# Patient Record
Sex: MALE | Race: WHITE | Employment: FULL TIME | URBAN - METROPOLITAN AREA
[De-identification: names, ages, dates, MRNs, and addresses within clinical notes are randomized per-mention and may not be internally consistent; named-entity substitution may affect disease eponyms.]

---

## 2018-01-01 ENCOUNTER — APPOINTMENT (OUTPATIENT)
Dept: GENERAL RADIOLOGY | Age: 48
End: 2018-01-01
Attending: INTERNAL MEDICINE
Payer: COMMERCIAL

## 2018-01-01 ENCOUNTER — HOSPITAL ENCOUNTER (EMERGENCY)
Age: 48
Discharge: HOME | End: 2018-01-01
Attending: INTERNAL MEDICINE
Payer: COMMERCIAL

## 2018-01-01 VITALS
WEIGHT: 220 LBS | DIASTOLIC BLOOD PRESSURE: 95 MMHG | RESPIRATION RATE: 16 BRPM | HEIGHT: 72 IN | SYSTOLIC BLOOD PRESSURE: 149 MMHG | TEMPERATURE: 98.8 F | HEART RATE: 107 BPM | OXYGEN SATURATION: 98 % | BODY MASS INDEX: 29.8 KG/M2

## 2018-01-01 DIAGNOSIS — L03.031 CELLULITIS OF TOE OF RIGHT FOOT: ICD-10-CM

## 2018-01-01 DIAGNOSIS — B35.1 FUNGAL TOENAIL INFECTION: Primary | ICD-10-CM

## 2018-01-01 LAB — GLUCOSE BLD STRIP.AUTO-MCNC: 246 MG/DL (ref 70–110)

## 2018-01-01 PROCEDURE — 73660 X-RAY EXAM OF TOE(S): CPT

## 2018-01-01 PROCEDURE — 90471 IMMUNIZATION ADMIN: CPT

## 2018-01-01 PROCEDURE — 82962 GLUCOSE BLOOD TEST: CPT

## 2018-01-01 PROCEDURE — 90715 TDAP VACCINE 7 YRS/> IM: CPT | Performed by: INTERNAL MEDICINE

## 2018-01-01 PROCEDURE — 74011250636 HC RX REV CODE- 250/636: Performed by: INTERNAL MEDICINE

## 2018-01-01 PROCEDURE — 99283 EMERGENCY DEPT VISIT LOW MDM: CPT

## 2018-01-01 RX ORDER — GLIMEPIRIDE 4 MG/1
4 TABLET ORAL
COMMUNITY

## 2018-01-01 RX ORDER — FAMOTIDINE 10 MG/ML
INJECTION INTRAVENOUS
Status: DISCONTINUED
Start: 2018-01-01 | End: 2018-01-02 | Stop reason: HOSPADM

## 2018-01-01 RX ORDER — MUPIROCIN 20 MG/G
OINTMENT TOPICAL 3 TIMES DAILY
Qty: 22 G | Refills: 0 | Status: SHIPPED | OUTPATIENT
Start: 2018-01-01

## 2018-01-01 RX ORDER — TERBINAFINE HYDROCHLORIDE 250 MG/1
250 TABLET ORAL DAILY
COMMUNITY

## 2018-01-01 RX ORDER — METFORMIN HYDROCHLORIDE 1000 MG/1
1000 TABLET ORAL 2 TIMES DAILY WITH MEALS
COMMUNITY

## 2018-01-01 RX ORDER — ATORVASTATIN CALCIUM 20 MG/1
20 TABLET, FILM COATED ORAL DAILY
COMMUNITY

## 2018-01-01 RX ORDER — CEPHALEXIN 500 MG/1
500 CAPSULE ORAL 3 TIMES DAILY
Qty: 21 CAP | Refills: 0 | Status: SHIPPED | OUTPATIENT
Start: 2018-01-01 | End: 2018-01-08

## 2018-01-01 RX ORDER — CLINDAMYCIN HYDROCHLORIDE 300 MG/1
300 CAPSULE ORAL 4 TIMES DAILY
Qty: 40 CAP | Refills: 0 | Status: SHIPPED | OUTPATIENT
Start: 2018-01-01 | End: 2018-01-11

## 2018-01-01 RX ORDER — LISINOPRIL 5 MG/1
5 TABLET ORAL DAILY
COMMUNITY

## 2018-01-01 RX ADMIN — TETANUS TOXOID, REDUCED DIPHTHERIA TOXOID AND ACELLULAR PERTUSSIS VACCINE, ADSORBED 0.5 ML: 5; 2.5; 8; 8; 2.5 SUSPENSION INTRAMUSCULAR at 20:40

## 2018-01-02 ENCOUNTER — HOSPITAL ENCOUNTER (INPATIENT)
Age: 48
LOS: 1 days | Discharge: HOME | DRG: 603 | End: 2018-01-03
Attending: EMERGENCY MEDICINE | Admitting: HOSPITALIST
Payer: COMMERCIAL

## 2018-01-02 DIAGNOSIS — L97.509 TYPE 2 DIABETES MELLITUS WITH FOOT ULCER, WITHOUT LONG-TERM CURRENT USE OF INSULIN (HCC): ICD-10-CM

## 2018-01-02 DIAGNOSIS — M86.171 ACUTE OSTEOMYELITIS OF TOE OF RIGHT FOOT (HCC): Primary | ICD-10-CM

## 2018-01-02 DIAGNOSIS — E11.621 TYPE 2 DIABETES MELLITUS WITH FOOT ULCER, WITHOUT LONG-TERM CURRENT USE OF INSULIN (HCC): ICD-10-CM

## 2018-01-02 PROBLEM — E11.69 ONYCHOMYCOSIS OF MULTIPLE TOENAILS WITH TYPE 2 DIABETES MELLITUS (HCC): Status: ACTIVE | Noted: 2018-01-02

## 2018-01-02 PROBLEM — M86.9 OSTEOMYELITIS OF TOE OF RIGHT FOOT (HCC): Status: ACTIVE | Noted: 2018-01-02

## 2018-01-02 PROBLEM — B35.1 ONYCHOMYCOSIS OF MULTIPLE TOENAILS WITH TYPE 1 DIABETES MELLITUS (HCC): Status: ACTIVE | Noted: 2018-01-02

## 2018-01-02 PROBLEM — B35.1 ONYCHOMYCOSIS OF MULTIPLE TOENAILS WITH TYPE 2 DIABETES MELLITUS (HCC): Status: ACTIVE | Noted: 2018-01-02

## 2018-01-02 PROBLEM — E10.69 ONYCHOMYCOSIS OF MULTIPLE TOENAILS WITH TYPE 1 DIABETES MELLITUS (HCC): Status: ACTIVE | Noted: 2018-01-02

## 2018-01-02 LAB
ANION GAP SERPL CALC-SCNC: 8 MMOL/L (ref 3–18)
BASOPHILS # BLD: 0 K/UL (ref 0–0.06)
BASOPHILS NFR BLD: 1 % (ref 0–2)
BUN SERPL-MCNC: 15 MG/DL (ref 7–18)
BUN/CREAT SERPL: 13 (ref 12–20)
CALCIUM SERPL-MCNC: 9.5 MG/DL (ref 8.5–10.1)
CHLORIDE SERPL-SCNC: 101 MMOL/L (ref 100–108)
CO2 SERPL-SCNC: 29 MMOL/L (ref 21–32)
CREAT SERPL-MCNC: 1.15 MG/DL (ref 0.6–1.3)
DIFFERENTIAL METHOD BLD: ABNORMAL
EOSINOPHIL # BLD: 0.3 K/UL (ref 0–0.4)
EOSINOPHIL NFR BLD: 4 % (ref 0–5)
ERYTHROCYTE [DISTWIDTH] IN BLOOD BY AUTOMATED COUNT: 13 % (ref 11.6–14.5)
GLUCOSE BLD STRIP.AUTO-MCNC: 201 MG/DL (ref 70–110)
GLUCOSE SERPL-MCNC: 147 MG/DL (ref 74–99)
HCT VFR BLD AUTO: 46.2 % (ref 36–48)
HGB BLD-MCNC: 15.8 G/DL (ref 13–16)
LYMPHOCYTES # BLD: 1 K/UL (ref 0.9–3.6)
LYMPHOCYTES NFR BLD: 15 % (ref 21–52)
MCH RBC QN AUTO: 30.5 PG (ref 24–34)
MCHC RBC AUTO-ENTMCNC: 34.2 G/DL (ref 31–37)
MCV RBC AUTO: 89.2 FL (ref 74–97)
MONOCYTES # BLD: 0.6 K/UL (ref 0.05–1.2)
MONOCYTES NFR BLD: 10 % (ref 3–10)
NEUTS SEG # BLD: 4.7 K/UL (ref 1.8–8)
NEUTS SEG NFR BLD: 70 % (ref 40–73)
PLATELET # BLD AUTO: 196 K/UL (ref 135–420)
PMV BLD AUTO: 10.2 FL (ref 9.2–11.8)
POTASSIUM SERPL-SCNC: 4.2 MMOL/L (ref 3.5–5.5)
RBC # BLD AUTO: 5.18 M/UL (ref 4.7–5.5)
SODIUM SERPL-SCNC: 138 MMOL/L (ref 136–145)
WBC # BLD AUTO: 6.6 K/UL (ref 4.6–13.2)

## 2018-01-02 PROCEDURE — 74011250636 HC RX REV CODE- 250/636: Performed by: PHYSICIAN ASSISTANT

## 2018-01-02 PROCEDURE — 85025 COMPLETE CBC W/AUTO DIFF WBC: CPT | Performed by: PHYSICIAN ASSISTANT

## 2018-01-02 PROCEDURE — 99283 EMERGENCY DEPT VISIT LOW MDM: CPT

## 2018-01-02 PROCEDURE — 65270000029 HC RM PRIVATE

## 2018-01-02 PROCEDURE — 74011000258 HC RX REV CODE- 258: Performed by: PHYSICIAN ASSISTANT

## 2018-01-02 PROCEDURE — 74011250636 HC RX REV CODE- 250/636: Performed by: HOSPITALIST

## 2018-01-02 PROCEDURE — 74011000250 HC RX REV CODE- 250: Performed by: HOSPITALIST

## 2018-01-02 PROCEDURE — 82962 GLUCOSE BLOOD TEST: CPT

## 2018-01-02 PROCEDURE — 75810000275 HC EMERGENCY DEPT VISIT NO LEVEL OF CARE

## 2018-01-02 PROCEDURE — 96374 THER/PROPH/DIAG INJ IV PUSH: CPT

## 2018-01-02 PROCEDURE — 87040 BLOOD CULTURE FOR BACTERIA: CPT | Performed by: PHYSICIAN ASSISTANT

## 2018-01-02 PROCEDURE — 80048 BASIC METABOLIC PNL TOTAL CA: CPT | Performed by: PHYSICIAN ASSISTANT

## 2018-01-02 RX ORDER — ENOXAPARIN SODIUM 100 MG/ML
40 INJECTION SUBCUTANEOUS EVERY 24 HOURS
Status: DISCONTINUED | OUTPATIENT
Start: 2018-01-02 | End: 2018-01-03 | Stop reason: HOSPADM

## 2018-01-02 RX ORDER — ATORVASTATIN CALCIUM 20 MG/1
20 TABLET, FILM COATED ORAL DAILY
Status: DISCONTINUED | OUTPATIENT
Start: 2018-01-03 | End: 2018-01-03 | Stop reason: HOSPADM

## 2018-01-02 RX ORDER — DEXTROSE 50 % IN WATER (D50W) INTRAVENOUS SYRINGE
50 AS NEEDED
Status: DISCONTINUED | OUTPATIENT
Start: 2018-01-02 | End: 2018-01-03 | Stop reason: HOSPADM

## 2018-01-02 RX ORDER — MAGNESIUM SULFATE 100 %
16 CRYSTALS MISCELLANEOUS AS NEEDED
Status: DISCONTINUED | OUTPATIENT
Start: 2018-01-02 | End: 2018-01-03 | Stop reason: HOSPADM

## 2018-01-02 RX ORDER — TERBINAFINE HYDROCHLORIDE 250 MG/1
250 TABLET ORAL DAILY
Status: DISCONTINUED | OUTPATIENT
Start: 2018-01-03 | End: 2018-01-03 | Stop reason: HOSPADM

## 2018-01-02 RX ORDER — INSULIN LISPRO 100 [IU]/ML
INJECTION, SOLUTION INTRAVENOUS; SUBCUTANEOUS
Status: DISCONTINUED | OUTPATIENT
Start: 2018-01-02 | End: 2018-01-03 | Stop reason: HOSPADM

## 2018-01-02 RX ORDER — LISINOPRIL 5 MG/1
5 TABLET ORAL DAILY
Status: DISCONTINUED | OUTPATIENT
Start: 2018-01-03 | End: 2018-01-03 | Stop reason: HOSPADM

## 2018-01-02 RX ADMIN — WATER 2 G: 1 INJECTION INTRAMUSCULAR; INTRAVENOUS; SUBCUTANEOUS at 20:57

## 2018-01-02 RX ADMIN — VANCOMYCIN HYDROCHLORIDE 2000 MG: 10 INJECTION, POWDER, LYOPHILIZED, FOR SOLUTION INTRAVENOUS at 15:47

## 2018-01-02 RX ADMIN — SODIUM CHLORIDE 2 G: 900 INJECTION, SOLUTION INTRAVENOUS at 15:11

## 2018-01-02 RX ADMIN — ENOXAPARIN SODIUM 40 MG: 40 INJECTION SUBCUTANEOUS at 20:58

## 2018-01-02 NOTE — ED NOTES
I have reviewed discharge instructions with the patient. The patient verbalized understanding. Patient armband removed and shredded. PT was given 3 prescriptions that were sent electronically to patients preferred pharmacy.

## 2018-01-02 NOTE — ED TRIAGE NOTES
Pt reports infection to 1st toe of left foot. Pt was seen at this facility yesterday and told to come back today for possible IV abx. Sepsis Screening completed    (  )Patient meets SIRS criteria. ( X )Patient does not meet SIRS criteria.       SIRS Criteria is achieved when two or more of the following are present   Temperature < 96.8°F (36°C) or > 100.9°F (38.3°C)   Heart Rate > 90 beats per minute   Respiratory Rate > 20 breaths per minute   WBC count > 12,000 or <4,000 or > 10% bands

## 2018-01-02 NOTE — ED PROVIDER NOTES
EMERGENCY DEPARTMENT HISTORY AND PHYSICAL EXAM    Date: 1/1/2018  Patient Name: Jerry Dai    History of Presenting Illness     Chief Complaint   Patient presents with    Foot Problem         History Provided By: Patient    Chief Complaint: Toe wound  Duration: 2 Days  Location: right great toe  Modifying Factors: Pt states no worsening or relieving factors  Associated Symptoms: swelling, redness     Additional History (Context):   8:04 PM  Jerry Dai is a 49 y.o. male with PMHX of HTN, high cholesterol, DM with neuropathy who presents to the emergency department C/O right great toe wound onset 2 days. Associated sxs include swelling and redness. Pt was dx with a fungal infection recently and was prescribed Lamisil, which he is about to finish. Pt states he was lifting 3 days ago, but does not recall injuring himself. Pt states his blood sugar is stable and has been well controlled. Pt's neuropathy is currently at baseline. Pt is unaware if Tetanus is UTD. Pt denies N/V/D, pain, fever/chills, and any other sxs or complaints. PCP: Patricia Tyson MD        Past History     Past Medical History:  Past Medical History:   Diagnosis Date    Diabetes ([address]     High cholesterol     Hypertension        Past Surgical History:  History reviewed. No pertinent surgical history. Family History:  History reviewed. No pertinent family history. Social History:  Social History   Substance Use Topics    Smoking status: None    Smokeless tobacco: None    Alcohol use None       Allergies:  No Known Allergies      Review of Systems   Review of Systems   Constitutional: Negative for chills and fever. Gastrointestinal: Negative for diarrhea, nausea and vomiting. Musculoskeletal: Negative for myalgias. Skin: Positive for wound (right large toe ). All other systems reviewed and are negative.       Physical Exam     Vitals:    01/01/18 2000   BP: (!) 149/95   Pulse: (!) 107   Resp: 16   Temp: 98.8 °F (37.1 °C)   SpO2: 98%   Weight: 99.8 kg (220 lb)   Height: 6' (1.829 m)     Physical Exam   Constitutional: He is oriented to person, place, and time. He appears well-developed and well-nourished. HENT:   Head: Normocephalic and atraumatic. Neck: Normal range of motion. Neck supple. No JVD present. No tracheal deviation present. No thyromegaly present. Cardiovascular: Normal rate, regular rhythm, normal heart sounds and intact distal pulses. 2+ DP Pulses   Pulmonary/Chest: Effort normal and breath sounds normal.   Musculoskeletal: Normal range of motion. He exhibits no edema or tenderness. Lymphadenopathy:     He has no cervical adenopathy. Neurological: He is alert and oriented to person, place, and time. He has normal reflexes. No cranial nerve deficit. He exhibits normal muscle tone. Coordination normal.   No focal weakness  Decreased sensation to soft touch of bilateral lower extremities   Skin: Skin is warm and dry. RIGHT LOWER EXTREMITY  Medial right great toe with a scab and dried blood. Nail with severe onychomycosis. No active bleeding, no foreign body  Swelling and redness up to the PIPJ  Disrupted nails on second and third toes       Psychiatric: He has a normal mood and affect. His behavior is normal. Thought content normal.   Nursing note and vitals reviewed.       Diagnostic Study Results     Labs -     Recent Results (from the past 12 hour(s))   GLUCOSE, POC    Collection Time: 01/01/18  8:20 PM   Result Value Ref Range    Glucose (POC) 246 (H) 70 - 110 mg/dL       Radiologic Studies -   XR GREAT TOE RT MIN 2 V    (Results Pending)     CT Results  (Last 48 hours)    None        CXR Results  (Last 48 hours)    None        9:15 PM  RADIOLOGY FINDINGS  Right large toe X-ray shows no acute fracture, no osteomyelitis   Pending review by Radiologist  Recorded by Charly Oquendo ED Scribe, as dictated by Myrna Adams MD    Medications given in the ED-  Medications   famotidine (PF) (PEPCID) 20 mg/2 mL injection (  Canceled Entry 1/1/18 2045)   diph,Pertuss(AC),Tet Vac-PF (BOOSTRIX) suspension 0.5 mL (0.5 mL IntraMUSCular Given 1/1/18 2040)         Medical Decision Making   I am the first provider for this patient. I reviewed the vital signs, available nursing notes, past medical history, past surgical history, family history and social history. Vital Signs-Reviewed the patient's vital signs. Pulse Oximetry Analysis - 98% on RA     Records Reviewed: Nursing Notes    Provider Notes (Medical Decision Making): Cellulitis of great toe with early abscess possible. Given pt is diabetic with neuropathy will r/o fracture and osteomyelitis. Procedures:  Procedures    ED Course:   8:04 PM - Initial assessment performed. The patients presenting problems have been discussed, and they are in agreement with the care plan formulated and outlined with them. I have encouraged them to ask questions as they arise throughout their visit. Diagnosis and Disposition       DISCHARGE NOTE:  9:16 PM  Shaniqua Goodell  results have been reviewed with him. He has been counseled regarding his diagnosis, treatment, and plan. He verbally conveys understanding and agreement of the signs, symptoms, diagnosis, treatment and prognosis and additionally agrees to follow up as discussed. He also agrees with the care-plan and conveys that all of his questions have been answered. I have also provided discharge instructions for him that include: educational information regarding their diagnosis and treatment, and list of reasons why they would want to return to the ED prior to their follow-up appointment, should his condition change. He has been provided with education for proper emergency department utilization. CLINICAL IMPRESSION:  1. Fungal toenail infection    2. Cellulitis of toe of right foot        PLAN:  1. D/C Home  2.    Discharge Medication List as of 1/1/2018  9:15 PM      START taking these medications Details   cephALEXin (KEFLEX) 500 mg capsule Take 1 Cap by mouth three (3) times daily for 7 days. , Normal, Disp-21 Cap, R-0      mupirocin (BACTROBAN) 2 % ointment Apply  to affected area three (3) times daily. Apply to area for 10 days, Normal, Disp-22 g, R-0      clindamycin (CLEOCIN) 300 mg capsule Take 1 Cap by mouth four (4) times daily for 10 days. , Normal, Disp-40 Cap, R-0         CONTINUE these medications which have NOT CHANGED    Details   metFORMIN (GLUCOPHAGE) 1,000 mg tablet Take 1,000 mg by mouth two (2) times daily (with meals). , Historical Med      glimepiride (AMARYL) 4 mg tablet Take 4 mg by mouth every morning., Historical Med      lisinopril (PRINIVIL, ZESTRIL) 5 mg tablet Take 5 mg by mouth daily. , Historical Med      atorvastatin (LIPITOR) 20 mg tablet Take 20 mg by mouth daily. , Historical Med      terbinafine HCl (LAMISIL) 250 mg tablet Take 250 mg by mouth daily. , Historical Med           3. Follow-up Information     Follow up With Details Comments Contact Info    Manoj Miles MD Schedule an appointment as soon as possible for a visit in 2 days For primary care follow up [address] [place] [address] EMERGENCY DEPT Go to As needed, if symptoms worsen [address] [id number]  [phone]        _______________________________    Attestations: This note is prepared by Andie Johnson, acting as Scribe for Nicole Ng MD.    Nicole Ng MD:  The scribe's documentation has been prepared under my direction and personally reviewed by me in its entirety.   I confirm that the note above accurately reflects all work, treatment, procedures, and medical decision making performed by me.  _______________________________

## 2018-01-02 NOTE — DISCHARGE INSTRUCTIONS
Dressing change 2x day. Keep the wound clean, may use soap and water to clean but otherwise keep it dry. Cellulitis: Care Instructions  Your Care Instructions    Cellulitis is a skin infection. It often occurs after a break in the skin from a scrape, cut, bite, or puncture, or after a rash. The doctor has checked you carefully, but problems can develop later. If you notice any problems or new symptoms, get medical treatment right away. Follow-up care is a key part of your treatment and safety. Be sure to make and go to all appointments, and call your doctor if you are having problems. It's also a good idea to know your test results and keep a list of the medicines you take. How can you care for yourself at home? · Take your antibiotics as directed. Do not stop taking them just because you feel better. You need to take the full course of antibiotics. · Prop up the infected area on pillows to reduce pain and swelling. Try to keep the area above the level of your heart as often as you can. · If your doctor told you how to care for your wound, follow your doctor's instructions. If you did not get instructions, follow this general advice:  ¨ Wash the wound with clean water 2 times a day. Don't use hydrogen peroxide or alcohol, which can slow healing. ¨ You may cover the wound with a thin layer of petroleum jelly, such as Vaseline, and a nonstick bandage. ¨ Apply more petroleum jelly and replace the bandage as needed. · Be safe with medicines. Take pain medicines exactly as directed. ¨ If the doctor gave you a prescription medicine for pain, take it as prescribed. ¨ If you are not taking a prescription pain medicine, ask your doctor if you can take an over-the-counter medicine. To prevent cellulitis in the future  · Try to prevent cuts, scrapes, or other injuries to your skin. Cellulitis most often occurs where there is a break in the skin.   · If you get a scrape, cut, mild burn, or bite, wash the wound with clean water as soon as you can to help avoid infection. Don't use hydrogen peroxide or alcohol, which can slow healing. · If you have swelling in your legs (edema), support stockings and good skin care may help prevent leg sores and cellulitis. · Take care of your feet, especially if you have diabetes or other conditions that increase the risk of infection. Wear shoes and socks. Do not go barefoot. If you have athlete's foot or other skin problems on your feet, talk to your doctor about how to treat them. When should you call for help? Call your doctor now or seek immediate medical care if:  ? · You have signs that your infection is getting worse, such as:  ¨ Increased pain, swelling, warmth, or redness. ¨ Red streaks leading from the area. ¨ Pus draining from the area. ¨ A fever. ? · You get a rash. ? Watch closely for changes in your health, and be sure to contact your doctor if:  ? · You are not getting better after 1 day (24 hours). ? · You do not get better as expected. Where can you learn more? Go to http://jimboRhytecrobin.info/. Jacinto Kerr in the search box to learn more about \"Cellulitis: Care Instructions. \"  Current as of: October 13, 2016  Content Version: 11.4  © 3049-5345 [employer]. Care instructions adapted under license by [employer] (which disclaims liability or warranty for this information). If you have questions about a medical condition or this instruction, always ask your healthcare professional. [place] [address] any warranty or liability for your use of this information. Toenail Fungus: Care Instructions  Your Care Instructions  A toenail that is infected by a fungus usually turns white or yellow. As the fungus spreads, the nail turns a darker color and gets thicker, and its edges start to turn ragged and crumble. A bad infection can cause toe pain, and the nail may pull away from the toe.   Toenails that are exposed to moisture and warmth a lot are more likely to get infected by a fungus. This can happen from wearing sweaty shoes often and from walking barefoot on shower floors. It is hard to treat toenail fungus, and the infection can return after it has cleared up. But medicines can sometimes get rid of toenail fungus for good. If the infection is very bad, or if it causes a lot of pain, you may need to have the nail removed. Follow-up care is a key part of your treatment and safety. Be sure to make and go to all appointments, and call your doctor if you are having problems. It's also a good idea to know your test results and keep a list of the medicines you take. How can you care for yourself at home? · Take your medicines exactly as prescribed. Call your doctor if you have any problems with your medicine. You will get more details on the specific medicines your doctor prescribes. · If your doctor gave you a cream or liquid to put on your toenail, use it exactly as directed. · Wash your feet often, and wash your hands after touching your feet. · Keep your toenails clean and dry. Dry your feet completely after you bathe and before you put on shoes and socks. · Keep your toenails trimmed. · Change socks often. Wear dry socks that absorb moisture. · Do not go barefoot in public places. · Use a spray or powder that fights fungus on your feet and in your shoes. · Do not pick at the skin around your nails. · Do not use nail polish or fake nails on your toenails. When should you call for help? Call your doctor now or seek immediate medical care if:  ? · You have signs of infection, such as:  ¨ Increased pain, swelling, warmth, or redness. ¨ Red streaks leading from the site. ¨ Pus draining from the site. ¨ A fever. ? · You have new or increased toe pain. ? Watch closely for changes in your health, and be sure to contact your doctor if:  ? · You do not get better as expected.    Where can you learn more? Go to http://jimbo-robin.info/. Enter D202 in the search box to learn more about \"Toenail Fungus: Care Instructions. \"  Current as of: October 13, 2016  Content Version: 11.4  © 4394-9695 [employer]. Care instructions adapted under license by [employer] (which disclaims liability or warranty for this information). If you have questions about a medical condition or this instruction, always ask your healthcare professional. [place] [address] any warranty or liability for your use of this information.

## 2018-01-02 NOTE — ED TRIAGE NOTES
Pt reports being treated for fungus to toenail of right foot with lamisil by PCP. Noticed this weekend that toes were bleeding and area looked infected. Sepsis Screening completed    (  )Patient meets SIRS criteria. ( x )Patient does not meet SIRS criteria.       SIRS Criteria is achieved when two or more of the following are present   Temperature < 96.8°F (36°C) or > 100.9°F (38.3°C)   Heart Rate > 90 beats per minute   Respiratory Rate > 20 breaths per minute   WBC count > 12,000 or <4,000 or > 10% bands

## 2018-01-02 NOTE — ED PROVIDER NOTES
EMERGENCY DEPARTMENT HISTORY AND PHYSICAL EXAM    Date: 1/2/2018  Patient Name: Shane Guajardo    History of Presenting Illness     Chief Complaint   Patient presents with    Nail Problem       History Provided By: Patient    Chief Complaint: toe wound  Duration: 3 Days  Location: right great toe  Associated Symptoms: swelling, minimal controlled bleeding, and redness    Additional History (Context):   2:45 PM  Shane Guajardo is a 49 y.o. male with PMHX of DM, HTN, HLD, neuropathy who presents to the emergency department C/O  right great toe wound onset 3 days ago. Pt was dx with a fungal infection recently and was prescribed Lamisil. Pt was seen by this facility for same 1 day ago, had lab work and xray done, was dx with cellulitis and fungal toe infection, and was rx Cleocin, Bactroban, and Keflex. Pt called today to return to ED for \"subtle periosteal reactive changes are noted in this region, with suspicion for osteomyelitis of the great toe distal phalanx\" seen on xray by radiologist. Associated symptoms include swelling, minimal controlled bleeding, and redness. Will schedule appointment with Dr. Lorain Leventhal, podiatrist. Pt denies fever, right great toe pain and any other Sx or complaints. PCP: Donavon Driscoll MD    Current Facility-Administered Medications   Medication Dose Route Frequency Provider Last Rate Last Dose    vancomycin (VANCOCIN) 2,000 mg in 0.9% sodium chloride 500 mL IVPB  2,000 mg IntraVENous ONCE COTY Cid 250 mL/hr at 01/02/18 1547 2,000 mg at 01/02/18 1547     Current Outpatient Prescriptions   Medication Sig Dispense Refill    metFORMIN (GLUCOPHAGE) 1,000 mg tablet Take 1,000 mg by mouth two (2) times daily (with meals).  glimepiride (AMARYL) 4 mg tablet Take 4 mg by mouth every morning.  lisinopril (PRINIVIL, ZESTRIL) 5 mg tablet Take 5 mg by mouth daily.  atorvastatin (LIPITOR) 20 mg tablet Take 20 mg by mouth daily.       terbinafine HCl (LAMISIL) 250 mg tablet Take 250 mg by mouth daily.  cephALEXin (KEFLEX) 500 mg capsule Take 1 Cap by mouth three (3) times daily for 7 days. 21 Cap 0    mupirocin (BACTROBAN) 2 % ointment Apply  to affected area three (3) times daily. Apply to area for 10 days 22 g 0    clindamycin (CLEOCIN) 300 mg capsule Take 1 Cap by mouth four (4) times daily for 10 days. 40 Cap 0       Past History     Past Medical History:  Past Medical History:   Diagnosis Date    Diabetes ([address]     High cholesterol     Hypertension        Past Surgical History:  History reviewed. No pertinent surgical history. Family History:  History reviewed. No pertinent family history. Social History:  Social History   Substance Use Topics    Smoking status: None    Smokeless tobacco: None    Alcohol use None       Allergies:  No Known Allergies      Review of Systems   Review of Systems   Constitutional: Negative for fever. Musculoskeletal: Negative for myalgias (right great toe pain). Skin: Positive for color change (erythema) and wound (right great toe ). (+) edema   (+) minimal controlled bleeding     All other systems reviewed and are negative. Physical Exam     Vitals:    01/02/18 1430   BP: (!) 143/97   Pulse: 93   Resp: 18   Temp: 98.2 °F (36.8 °C)   SpO2: 98%   Weight: 99.8 kg (220 lb 0.3 oz)   Height: 6' (1.829 m)     Physical Exam   Vital signs and nursing notes reviewed. CONSTITUTIONAL: Alert. Well-appearing; well-nourished; in no apparent distress. CV: Normal S1, S2; no murmurs, rubs, or gallops. No chest wall tenderness. RESPIRATORY: Normal chest excursion with respiration; breath sounds clear and equal bilaterally; no wheezes, rhonchi, or rales. EXT: Right great toe swollen and erythematous with raised partially detached thickened yellow nail; single <1cm open superficial wound to tip of great toe, no drainage or fluctuance, sensation intact, cap refill  <2sec.   SKIN: Normal for age and race; warm; dry; good turgor; no apparent lesions or exudate. NEURO: A & O x3. Motor 5/5 bilaterally. Sensation intact. PSYCH:  Mood and affect appropriate. Diagnostic Study Results     Labs -     Recent Results (from the past 12 hour(s))   CBC WITH AUTOMATED DIFF    Collection Time: 01/02/18  3:00 PM   Result Value Ref Range    WBC 6.6 4.6 - 13.2 K/uL    RBC 5.18 4.70 - 5.50 M/uL    HGB 15.8 13.0 - 16.0 g/dL    HCT 46.2 36.0 - 48.0 %    MCV 89.2 74.0 - 97.0 FL    MCH 30.5 24.0 - 34.0 PG    MCHC 34.2 31.0 - 37.0 g/dL    RDW 13.0 11.6 - 14.5 %    PLATELET 434 768 - 389 K/uL    MPV 10.2 9.2 - 11.8 FL    NEUTROPHILS 70 40 - 73 %    LYMPHOCYTES 15 (L) 21 - 52 %    MONOCYTES 10 3 - 10 %    EOSINOPHILS 4 0 - 5 %    BASOPHILS 1 0 - 2 %    ABS. NEUTROPHILS 4.7 1.8 - 8.0 K/UL    ABS. LYMPHOCYTES 1.0 0.9 - 3.6 K/UL    ABS. MONOCYTES 0.6 0.05 - 1.2 K/UL    ABS. EOSINOPHILS 0.3 0.0 - 0.4 K/UL    ABS. BASOPHILS 0.0 0.0 - 0.06 K/UL    DF AUTOMATED     METABOLIC PANEL, BASIC    Collection Time: 01/02/18  3:00 PM   Result Value Ref Range    Sodium 138 136 - 145 mmol/L    Potassium 4.2 3.5 - 5.5 mmol/L    Chloride 101 100 - 108 mmol/L    CO2 29 21 - 32 mmol/L    Anion gap 8 3.0 - 18 mmol/L    Glucose 147 (H) 74 - 99 mg/dL    BUN 15 7.0 - 18 MG/DL    Creatinine 1.15 0.6 - 1.3 MG/DL    BUN/Creatinine ratio 13 12 - 20      GFR est AA >60 >60 ml/min/1.73m2    GFR est non-AA >60 >60 ml/min/1.73m2    Calcium 9.5 8.5 - 10.1 MG/DL       Radiologic Studies -   MRI FOOT RT W CONT    (Results Pending)     CT Results  (Last 48 hours)    None        CXR Results  (Last 48 hours)    None          Medications given in the ED-  Medications   vancomycin (VANCOCIN) 2,000 mg in 0.9% sodium chloride 500 mL IVPB (2,000 mg IntraVENous New Bag 1/2/18 1547)   cefepime (MAXIPIME) 2 g in 0.9% sodium chloride (MBP/ADV) 100 mL ADV (2 g IntraVENous Given 1/2/18 1511)         Medical Decision Making   I am the first provider for this patient.     I reviewed the vital signs, available nursing notes, past medical history, past surgical history, family history and social history. Vital Signs-Reviewed the patient's vital signs. Pulse Oximetry Analysis - 98% on RA     Records Reviewed: Nursing Notes and Old Medical Records      Procedures:  Procedures    ED Course:   2:45 PM Initial assessment performed. The patients presenting problems have been discussed, and they are in agreement with the care plan formulated and outlined with them. I have encouraged them to ask questions as they arise throughout their visit. 3:15 PM Discussed patient's history, exam, and available diagnostics results with Almita Rdz MD, ED physician, who recommends adding blood culture, Vancocin, and Cefepime. 3:20 PM Discussed patient's history, exam, and available diagnostics results with Santy Walsh MD, hospitalist, who accepts patient to medical.    3:44 PM Discussed patient's history, exam, and available diagnostics results with Renate Spears DPM, who requested an MRI of the foot with contrast and will consult. 4:00PM  MRI tech not in ED. Will get MRI in am.      Diagnosis and Disposition     Core Measures:  For Hospitalized Patients:    1. Hospitalization Decision Time:  The decision to hospitalize the patient was made by Ghassan Monson PA-C at 3:00 PM on 1/2/2018    2. Aspirin: Aspirin was not given because the patient did not present with a stroke at the time of their Emergency Department evaluation    3:25 PM  Patient is being admitted to the hospital by Santy Walsh MD. The results of their tests and reasons for their admission have been discussed with them and/or available family. They convey agreement and understanding for the need to be admitted and for their admission diagnosis. CONDITIONS ON ADMISSION:  Sepsis is not present at the time of admission. Deep Vein Thrombosis is not present at the time of admission. Thrombosis is not present at the time of admission. Urinary Tract Infection is not present at the time of admission. Pneumonia is not present at the time of admission. MRSA unknown at the time of admission. Wound infection is present at the time of admission. Pressure Ulcer is not present at the time of admission. CLINICAL IMPRESSION:    1. Acute osteomyelitis of toe of right foot ([facility] [address]    2. Type 2 diabetes mellitus with foot ulcer, without long-term current use of insulin ([facility] [address] [facility]        PLAN:  1. Admission  _______________________________    Attestations: This note is prepared by Carol Garcia, acting as Scribe for Constance Jameson PA-C. Constance Jameson PA-C:  The scribe's documentation has been prepared under my direction and personally reviewed by me in its entirety.   I confirm that the note above accurately reflects all work, treatment, procedures, and medical decision making performed by me.  _______________________________

## 2018-01-02 NOTE — CALL BACK NOTE
Voicemail left for patient to call back. Needs to [facility] asap. Great toe x-ray discrepancy suggestive of osteomyelitis.

## 2018-01-02 NOTE — ED NOTES
ANU ANSARI gave V.O.  To change MRI order to routine instead of STAT;  MRI tech, Janna Diaz, is aware and will do testing in am;

## 2018-01-02 NOTE — IP AVS SNAPSHOT
[address] [address] 
[phone] Patient: Summer Armendariz MRN: [id number] Ivan Lovettari About your hospitalization You were admitted on:  January 2, 2018 You last received care in the:  [address] [place] [address] You were discharged on:  January 3, 2018 Why you were hospitalized Your primary diagnosis was:  Osteomyelitis Of Toe Of Right Foot (Hcc) Your diagnoses also included:  Onychomycosis Of Multiple Toenails With Type 2 Diabetes Mellitus (Hcc), Diabetes (Hcc), Hypertension Follow-up Information Follow up With Details Comments Contact Info Anabel Lechuga [facility] 
[phone] SANDY Dexter at [facility]. podiatry office next week. Continue oral abx until this time. [address] [facility] [address] [place] [address] 
[phone] Discharge Orders None A check arthur indicates which time of day the medication should be taken. My Medications CONTINUE taking these medications Instructions Each Dose to Equal  
 Morning Noon Evening Bedtime  
 atorvastatin 20 mg tablet Commonly known as:  LIPITOR Your last dose was: Your next dose is: Take 20 mg by mouth daily. 20 mg  
    
   
   
   
  
 cephALEXin 500 mg capsule Commonly known as:  Verlena Bell Your last dose was: Your next dose is: Take 1 Cap by mouth three (3) times daily for 7 days. 500 mg  
    
   
   
   
  
 clindamycin 300 mg capsule Commonly known as:  CLEOCIN Your last dose was: Your next dose is: Take 1 Cap by mouth four (4) times daily for 10 days. 300 mg  
    
   
   
   
  
 glimepiride 4 mg tablet Commonly known as:  AMARYL Your last dose was: Your next dose is: Take 4 mg by mouth every morning. 4 mg LamISIL 250 mg tablet Generic drug:  terbinafine HCl Your last dose was: Your next dose is: Take 250 mg by mouth daily. 250 mg  
    
   
   
   
  
 lisinopril 5 mg tablet Commonly known as:  Jaxson Upland Your last dose was: Your next dose is: Take 5 mg by mouth daily. 5 mg  
    
   
   
   
  
 metFORMIN 1,000 mg tablet Commonly known as:  GLUCOPHAGE Your last dose was: Your next dose is: Take 1,000 mg by mouth two (2) times daily (with meals). 1000 mg  
    
   
   
   
  
 mupirocin 2 % ointment Commonly known as:  [facility] Your last dose was: Your next dose is:    
   
   
 Apply  to affected area three (3) times daily. Apply to area for 10 days Discharge Instructions Diabetes Foot Health: Care Instructions Your Care Instructions When you have diabetes, your feet need extra care and attention. Diabetes can damage the nerve endings and blood vessels in your feet, making you less likely to notice when your feet are injured. Diabetes also limits your body's ability to fight infection and get blood to areas that need it. If you get a minor foot injury, it could become an ulcer or a serious infection. With good foot care, you can prevent most of these problems. Caring for your feet can be quick and easy. Most of the care can be done when you are bathing or getting ready for bed. Follow-up care is a key part of your treatment and safety. Be sure to make and go to all appointments, and call your doctor if you are having problems. It's also a good idea to know your test results and keep a list of the medicines you take. How can you care for yourself at home? · Keep your blood sugar close to normal by watching what and how much you eat, monitoring blood sugar, taking medicines if prescribed, and getting regular exercise. · Do not smoke. Smoking affects blood flow and can make foot problems worse. If you need help quitting, talk to your doctor about stop-smoking programs and medicines. These can increase your chances of quitting for good. · Eat a diet that is low in fats. High fat intake can cause fat to build up in your blood vessels and decrease blood flow. · Inspect your feet daily for blisters, cuts, cracks, or sores. If you cannot see well, use a mirror or have someone help you. · Take care of your feet: 
[facility] your feet every day. Use warm (not hot) water. Check the water temperature with your wrists or other part of your body, not your feet. ¨ Dry your feet well. Pat them dry. Do not rub the skin on your feet too hard. Dry well between your toes. If the skin on your feet stays moist, bacteria or a fungus can grow, which can lead to infection. ¨ Keep your skin soft. Use moisturizing skin cream to keep the skin on your feet soft and prevent calluses and cracks. But do not put the cream between your toes, and stop using any cream that causes a rash. ¨ Clean underneath your toenails carefully. Do not use a sharp object to clean underneath your toenails. Use the blunt end of a nail file or other rounded tool. ¨ Trim and file your toenails straight across to prevent ingrown toenails. Use a nail clipper, not scissors. Use an emery board to smooth the edges. · Change socks daily. Socks without seams are best, because seams often rub the feet. You can find socks for people with diabetes from specialty catalogs. · Look inside your shoes every day for things like gravel or torn linings, which could cause blisters or sores. · Buy shoes that fit well: 
¨ Look for shoes that have plenty of space around the toes. This helps prevent bunions and blisters. ¨ Try on shoes while wearing the kind of socks you will usually wear with the shoes. ¨ Avoid plastic shoes. They may rub your feet and cause blisters.  Good shoes should be made of materials that are flexible and breathable, such as leather or cloth. ¨ Break in new shoes slowly by wearing them for no more than an hour a day for several days. Take extra time to check your feet for red areas, blisters, or other problems after you wear new shoes. · Do not go barefoot. Do not wear sandals, and do not wear shoes with very thin soles. Thin soles are easy to puncture. They also do not protect your feet from hot pavement or cold weather. · Have your doctor check your feet during each visit. If you have a foot problem, see your doctor. Do not try to treat an early foot problem at home. Home remedies or treatments that you can buy without a prescription (such as corn removers) can be harmful. · Always get early treatment for foot problems. A minor irritation can lead to a major problem if not properly cared for early. When should you call for help? Call your doctor now or seek immediate medical care if: 
? · You have a foot sore, an ulcer or break in the skin that is not healing after 4 days, bleeding corns or calluses, or an ingrown toenail. ? · You have blue or black areas, which can mean bruising or blood flow problems. ? · You have peeling skin or tiny blisters between your toes or cracking or oozing of the skin. ? · You have a fever for more than 24 hours and a foot sore. ? · You have new numbness or tingling in your feet that does not go away after you move your feet or change positions. ? · You have unexplained or unusual swelling of the foot or ankle. ? Watch closely for changes in your health, and be sure to contact your doctor if: 
? · You cannot do proper foot care. Where can you learn more? Go to http://jimbo-robin.info/. Enter A739 in the search box to learn more about \"Diabetes Foot Health: Care Instructions. \" Current as of: March 13, 2017 Content Version: 11.4 © 2907-7029 [employer]. Care instructions adapted under license by [employer] (which disclaims liability or warranty for this information). If you have questions about a medical condition or this instruction, always ask your healthcare professional. [address] any warranty or liability for your use of this information. Cellulitis: Care Instructions Your Care Instructions Cellulitis is a skin infection. It often occurs after a break in the skin from a scrape, cut, bite, or puncture, or after a rash. The doctor has checked you carefully, but problems can develop later. If you notice any problems or new symptoms, get medical treatment right away. Follow-up care is a key part of your treatment and safety. Be sure to make and go to all appointments, and call your doctor if you are having problems. It's also a good idea to know your test results and keep a list of the medicines you take. How can you care for yourself at home? · Take your antibiotics as directed. Do not stop taking them just because you feel better. You need to take the full course of antibiotics. · Prop up the infected area on pillows to reduce pain and swelling. Try to keep the area above the level of your heart as often as you can. · If your doctor told you how to care for your wound, follow your doctor's instructions. If you did not get instructions, follow this general advice: ¨ Wash the wound with clean water 2 times a day. Don't use hydrogen peroxide or alcohol, which can slow healing. ¨ You may cover the wound with a thin layer of petroleum jelly, such as Vaseline, and a nonstick bandage. ¨ Apply more petroleum jelly and replace the bandage as needed. · Be safe with medicines. Take pain medicines exactly as directed. ¨ If the doctor gave you a prescription medicine for pain, take it as prescribed.  
¨ If you are not taking a prescription pain medicine, ask your doctor if you can take an over-the-counter medicine. To prevent cellulitis in the future · Try to prevent cuts, scrapes, or other injuries to your skin. Cellulitis most often occurs where there is a break in the skin. · If you get a scrape, cut, mild burn, or bite, wash the wound with clean water as soon as you can to help avoid infection. Don't use hydrogen peroxide or alcohol, which can slow healing. · If you have swelling in your legs (edema), support stockings and good skin care may help prevent leg sores and cellulitis. · Take care of your feet, especially if you have diabetes or other conditions that increase the risk of infection. Wear shoes and socks. Do not go barefoot. If you have athlete's foot or other skin problems on your feet, talk to your doctor about how to treat them. When should you call for help? Call your doctor now or seek immediate medical care if: 
? · You have signs that your infection is getting worse, such as: 
¨ Increased pain, swelling, warmth, or redness. ¨ Red streaks leading from the area. ¨ Pus draining from the area. ¨ A fever. ? · You get a rash. ? Watch closely for changes in your health, and be sure to contact your doctor if: 
? · You are not getting better after 1 day (24 hours). ? · You do not get better as expected. Where can you learn more? Go to http://jimbo-robin.info/. Jimena Finders in the search box to learn more about \"Cellulitis: Care Instructions. \" Current as of: October 13, 2016 Content Version: 11.4 © 9108-3119 [employer]. Care instructions adapted under license by [employer] (which disclaims liability or warranty for this information). If you have questions about a medical condition or this instruction, always ask your healthcare professional. [place] [address] any warranty or liability for your use of this information. Patient armband removed and shredded DISCHARGE SUMMARY from Nurse PATIENT INSTRUCTIONS: 
 
 
F-face looks uneven A-arms unable to move or move unevenly S-speech slurred or non-existent T-time-call 911 as soon as signs and symptoms begin-DO NOT go Back to bed or wait to see if you get better-TIME IS BRAIN. Warning Signs of HEART ATTACK Call 911 if you have these symptoms: 
? Chest discomfort. Most heart attacks involve discomfort in the center of the chest that lasts more than a few minutes, or that goes away and comes back. It can feel like uncomfortable pressure, squeezing, fullness, or pain. ? Discomfort in other areas of the upper body. Symptoms can include pain or discomfort in one or both arms, the back, neck, jaw, or stomach. ? Shortness of breath with or without chest discomfort. ? Other signs may include breaking out in a cold sweat, nausea, or lightheadedness. Don't wait more than five minutes to call [address]! Fast action can save your life. Calling 911 is almost always the fastest way to get lifesaving treatment. Emergency Medical Services staff can begin treatment when they arrive  up to an hour sooner than if someone gets to the hospital by car. The discharge information has been reviewed with the patient. The patient verbalized understanding. Discharge medications reviewed with the patient and appropriate educational materials and side effects teaching were provided. ___________________________________________________________________________________________________________________________________ Introducing [facility] SERVICES! Jacqueline Landon introduces [employer] patient portal. Now you can access parts of your medical record, email your doctor's office, and request medication refills online. 1. In your internet browser, go to [email or web address] 2. Click on the First Time User? Click Here link in the Sign In box. You will see the New Member Sign Up page. 3. Enter your [employer] Access Code exactly as it appears below. You will not need to use this code after youve completed the sign-up process. If you do not sign up before the expiration date, you must request a new code. · [employer] Access Code: [email or web address] Expires: 4/1/2018  9:14 PM 
 
4. Enter the last four digits of your Social Security Number (xxxx) and Date of Birth (mm/dd/yyyy) as indicated and click Submit. You will be taken to the next sign-up page. 5. Create a [employer] ID. This will be your [employer] login ID and cannot be changed, so think of one that is secure and easy to remember. 6. Create a [employer] password. You can change your password at any time. 7. Enter your Password Reset Question and Answer. This can be used at a later time if you forget your password. 8. Enter your e-mail address. You will receive e-mail notification when new information is available in [address] 9. Click Sign Up. You can now view and download portions of your medical record. 10. Click the Download Summary menu link to download a portable copy of your medical information. If you have questions, please visit the Frequently Asked Questions section of the [employer] website. Remember, [employer] is NOT to be used for urgent needs. For medical emergencies, dial 911. Now available from your iPhone and Android! Unresulted Labs-Please follow up with your PCP about these lab tests Order Current Status CULTURE, BLOOD Preliminary result Providers Seen During Your Hospitalization Provider Specialty Primary office phone Brad Srinivasan MD Emergency Medicine [phone] Sekou Delong MD Emergency Medicine [phone] Zakia Maynard MD [facility] [phone] Your Primary Care Physician (PCP) Primary Care Physician Office Phone Office Fax Golden Flores [phone] You are allergic to the following No active allergies Recent Documentation Height Weight BMI Smoking Status 1.829 m 99.8 kg 31.57 kg/m2 Never Assessed Emergency Contacts Name Discharge Info Relation Home Work Mobile [address] CAREGIVER [3] Father [15] [phone] Patient Belongings The following personal items are in your possession at time of discharge: 
  Dental Appliances: None  Visual Aid: None      Home Medications: Kept at bedside   Jewelry: None  Clothing: At bedside    Other Valuables:  (cellphone) Please provide this summary of care documentation to your next provider. Signatures-by signing, you are acknowledging that this After Visit Summary has been reviewed with you and you have received a copy. Patient Signature:  ____________________________________________________________ Date:  ____________________________________________________________  
  
[facility] Provider Signature:  ____________________________________________________________ Date:  ____________________________________________________________

## 2018-01-03 ENCOUNTER — APPOINTMENT (OUTPATIENT)
Dept: MRI IMAGING | Age: 48
DRG: 603 | End: 2018-01-03
Attending: PHYSICIAN ASSISTANT
Payer: COMMERCIAL

## 2018-01-03 VITALS
HEIGHT: 72 IN | HEART RATE: 74 BPM | RESPIRATION RATE: 16 BRPM | TEMPERATURE: 97.9 F | SYSTOLIC BLOOD PRESSURE: 110 MMHG | OXYGEN SATURATION: 96 % | BODY MASS INDEX: 29.8 KG/M2 | WEIGHT: 220.02 LBS | DIASTOLIC BLOOD PRESSURE: 63 MMHG

## 2018-01-03 LAB
ANION GAP SERPL CALC-SCNC: 10 MMOL/L (ref 3–18)
BUN SERPL-MCNC: 15 MG/DL (ref 7–18)
BUN/CREAT SERPL: 14 (ref 12–20)
CALCIUM SERPL-MCNC: 9.4 MG/DL (ref 8.5–10.1)
CHLORIDE SERPL-SCNC: 103 MMOL/L (ref 100–108)
CO2 SERPL-SCNC: 27 MMOL/L (ref 21–32)
CREAT SERPL-MCNC: 1.11 MG/DL (ref 0.6–1.3)
ERYTHROCYTE [DISTWIDTH] IN BLOOD BY AUTOMATED COUNT: 13 % (ref 11.6–14.5)
GLUCOSE BLD STRIP.AUTO-MCNC: 121 MG/DL (ref 70–110)
GLUCOSE BLD STRIP.AUTO-MCNC: 122 MG/DL (ref 70–110)
GLUCOSE SERPL-MCNC: 119 MG/DL (ref 74–99)
HCT VFR BLD AUTO: 43.2 % (ref 36–48)
HGB BLD-MCNC: 14.6 G/DL (ref 13–16)
MCH RBC QN AUTO: 30.3 PG (ref 24–34)
MCHC RBC AUTO-ENTMCNC: 33.8 G/DL (ref 31–37)
MCV RBC AUTO: 89.6 FL (ref 74–97)
PLATELET # BLD AUTO: 167 K/UL (ref 135–420)
PMV BLD AUTO: 10.2 FL (ref 9.2–11.8)
POTASSIUM SERPL-SCNC: 4.5 MMOL/L (ref 3.5–5.5)
RBC # BLD AUTO: 4.82 M/UL (ref 4.7–5.5)
SODIUM SERPL-SCNC: 140 MMOL/L (ref 136–145)
WBC # BLD AUTO: 7.7 K/UL (ref 4.6–13.2)

## 2018-01-03 PROCEDURE — A9577 INJ MULTIHANCE: HCPCS | Performed by: HOSPITALIST

## 2018-01-03 PROCEDURE — 74011250636 HC RX REV CODE- 250/636: Performed by: HOSPITALIST

## 2018-01-03 PROCEDURE — 80048 BASIC METABOLIC PNL TOTAL CA: CPT | Performed by: HOSPITALIST

## 2018-01-03 PROCEDURE — 82962 GLUCOSE BLOOD TEST: CPT

## 2018-01-03 PROCEDURE — 74011250637 HC RX REV CODE- 250/637: Performed by: HOSPITALIST

## 2018-01-03 PROCEDURE — 36415 COLL VENOUS BLD VENIPUNCTURE: CPT | Performed by: HOSPITALIST

## 2018-01-03 PROCEDURE — 85027 COMPLETE CBC AUTOMATED: CPT | Performed by: HOSPITALIST

## 2018-01-03 PROCEDURE — 73720 MRI LWR EXTREMITY W/O&W/DYE: CPT

## 2018-01-03 RX ADMIN — GADOBENATE DIMEGLUMINE 20 ML: 529 INJECTION, SOLUTION INTRAVENOUS at 09:58

## 2018-01-03 RX ADMIN — VANCOMYCIN HYDROCHLORIDE 1750 MG: 10 INJECTION, POWDER, LYOPHILIZED, FOR SOLUTION INTRAVENOUS at 04:05

## 2018-01-03 NOTE — DISCHARGE SUMMARY
Discharge Summary    Patient: Ankit Panda MRN: [id number]  CSN: [id number]    YOB: 1970  Age: 49 y.o. Sex: male    DOA: 1/2/2018 LOS:  LOS: 1 day   Discharge Date: 1/3/2018     Primary Care Provider:  Mayela Bernal MD    Admission Diagnoses: Osteomyelitis of toe of right foot [facility])    Discharge Diagnoses:    Problem List as of 1/3/2018  Never Reviewed          Codes Class Noted - Resolved    * (Principal)Osteomyelitis of toe of right foot ([facility].) ICD-10-CM: M86.9  ICD-9-CM: 730.27  1/2/2018 - Present        Onychomycosis of multiple toenails with type 2 diabetes mellitus ([facility].) ICD-10-CM: E11.69, B35.1  ICD-9-CM: 250.80, 110.1  1/2/2018 - Present        Diabetes ([facility].) ICD-10-CM: E11.9  ICD-9-CM: 250.00  Unknown - Present        Hypertension ICD-10-CM: I10  ICD-9-CM: 401.9  Unknown - Present              Discharge Medications:     Discharge Medication List as of 1/3/2018  1:09 PM      CONTINUE these medications which have NOT CHANGED    Details   metFORMIN (GLUCOPHAGE) 1,000 mg tablet Take 1,000 mg by mouth two (2) times daily (with meals). , Historical Med      glimepiride (AMARYL) 4 mg tablet Take 4 mg by mouth every morning., Historical Med      lisinopril (PRINIVIL, ZESTRIL) 5 mg tablet Take 5 mg by mouth daily. , Historical Med      atorvastatin (LIPITOR) 20 mg tablet Take 20 mg by mouth daily. , Historical Med      terbinafine HCl (LAMISIL) 250 mg tablet Take 250 mg by mouth daily. , Historical Med      cephALEXin (KEFLEX) 500 mg capsule Take 1 Cap by mouth three (3) times daily for 7 days. , Normal, Disp-21 Cap, R-0      mupirocin (BACTROBAN) 2 % ointment Apply  to affected area three (3) times daily. Apply to area for 10 days, Normal, Disp-22 g, R-0      clindamycin (CLEOCIN) 300 mg capsule Take 1 Cap by mouth four (4) times daily for 10 days. , Normal, Disp-40 Cap, R-0             Discharge Condition: Good      Consults: podiatry      PHYSICAL EXAM   Visit Vitals    /63 (BP 1 Location: Left arm, BP Patient Position: At rest)    Pulse 74    Temp 97.9 °F (36.6 °C)    Resp 16    Ht 6' (1.829 m)    Wt 99.8 kg (220 lb 0.3 oz)    SpO2 96%    BMI 31.57 kg/m2     General: Awake, cooperative, no acute distress    HEENT: NC, Atraumatic. PERRLA, EOMI. Anicteric sclerae. Lungs:  CTA Bilaterally. No Wheezing/Rhonchi/Rales. Heart:  Regular  rhythm,  No murmur, No Rubs, No Gallops  Abdomen: Soft, Non distended, Non tender. +Bowel sounds,   Extremities: Onychomycosis on right toe nails. Right great toe with detached nail. Psych:   Not anxious or agitated. Neurologic:  No acute neurological deficits. Admission HPI : Shane Guajardo is a 49 y.o. male who has past history of DM ,HTN, onychomycosis presents to ER with concerns of right great toe infection. Patient reports that he was diagnosed with onychomycosis of right toes mostly great toe. He was seen by his PCP about 2 months ago and was prescribed terbinafine for 12 weeks. He has completed 9 weeks. He has been working a lot recently and noticed his right great toe  from skin. He showed picture of it to his friends father who is retired podiatrist and he recommended to go to ER as he was concerned that he has bacterial infection. He was seen in ER yesterday and discharged on cleocin and keflex. X-ray read by radiology today showed osteomyelitis of right great toe and he was called to come and get admitted. He denies any fever/chills. Hospital Course :   Onychomycosis and right great toe cellulitis - patient admitted to medical floor and started on vancomycin and ceftriaxone. He was seen by podiatry, MRI did not show evidence of osteomyelitis. Podiatry recommended discharging on his PTA cleocin and keflex. He will follow up with podiatry in one week.     Activity: Activity as tolerated    Diet: Diabetic Diet    Follow-up: PCP, podiatry    Disposition: home    Minutes spent on discharge: 40 Labs: Results:       Chemistry Recent Labs      01/03/18   0353  01/02/18   1500   GLU  119*  147*   NA  140  138   K  4.5  4.2   CL  103  101   CO2  27  29   BUN  15  15   CREA  1.11  1.15   CA  9.4  9.5   AGAP  10  8   BUCR  14  13      CBC w/Diff Recent Labs      01/03/18   0353  01/02/18   1500   WBC  7.7  6.6   RBC  4.82  5.18   HGB  14.6  15.8   HCT  43.2  46.2   PLT  167  196   GRANS   --   70   LYMPH   --   15*   EOS   --   4      Cardiac Enzymes No results for input(s): CPK, CKND1, KING in the last 72 hours. No lab exists for component: CKRMB, TROIP   Coagulation No results for input(s): PTP, INR, APTT in the last 72 hours. No lab exists for component: INREXT    Lipid Panel No results found for: CHOL, CHOLPOCT, CHOLX, CHLST, CHOLV, 534766, HDL, LDL, LDLC, DLDLP, 224964, VLDLC, VLDL, TGLX, TRIGL, TRIGP, TGLPOCT, CHHD, CHHDX   BNP No results for input(s): BNPP in the last 72 hours. Liver Enzymes No results for input(s): TP, ALB, TBIL, AP, SGOT, GPT in the last 72 hours. No lab exists for component: DBIL   Thyroid Studies No results found for: T4, T3U, TSH, TSHEXT         Significant Diagnostic Studies: Xr Great Toe Rt Min 2 V    Result Date: 1/2/2018  Examination: Right foot great toe 3 views. HISTORY: Soft tissue infection of the right great toe. FINDINGS: Dorsal projection of the right foot as well as oblique and lateral views of the right foot great toe are obtained. No acute malalignment demonstrated on these nonweightbearing projections. Soft tissue wound noted to involve the nailbed of the great toe, which tracks to the dorsal margin of the distal phalanx. There are subtle periosteal reactive changes are demonstrated on the oblique projection. No fracture. No retained radiopaque foreign object. Moderate severity interphalangeal joint osteoarthritic change is present. IMPRESSION: 1.  Soft tissue ulceration involving the right foot great toe nail bed, which tracks to the great toe distal phalanx. Subtle periosteal reactive changes are noted in this region, with suspicion for osteomyelitis of the great toe distal phalanx. Mri Foot Rt W  Wo Cont    Result Date: 1/3/2018  Examination: MRI right forefoot without and with contrast. HISTORY: 49year-old diabetic patient with right foot great toe swelling. Evaluation for possible osteomyelitis is requested. COMPARISON: Right foot great toe radiographs dated 1/1/2018. TECHNIQUE: MR examination of the right forefoot was performed utilizing a local coil. Coronal and sagittal STIR and T1 images as well as axial T1 and T2 fat-suppressed images are obtained before the uneventful intravenous administration of 20 mL MultiHance intravenous gadolinium contrast. Postcontrast imaging was performed in axial, coronal, and sagittal planes utilizing T1 fat-suppressed techniques. FINDINGS: Evaluation of the bone marrow signal of the imaged right forefoot demonstrates minimal T1 marrow hypointensity without substantial intramedullary enhancement involving the distalmost aspect of the great toe distal phalanx. Mild periostitis noted, in keeping with radiographic findings. Soft tissue ulceration adjacent to the nail bed is noted. There is no organized or drainable fluid collection. Additional faint marrow edema with mild subchondral flattening is noted involving the third metatarsal head. Remaining bone marrow signal is within normal limits. Included tarsometatarsal joints are normal in alignment and appearance. There is very mild right first metatarsophalangeal joint chondrosis, with more moderate appearing chondrosis involving the interphalangeal joint of the great toe. Alignment at the plantar plate of the great toe appears within normal limits. Evaluation of the lesser toe metatarsophalangeal joints is remarkable for small joint effusion/synovitis involving the third metatarsophalangeal joint.  Soft tissues demonstrate mild and diffuse intrinsic muscular atrophy, with faintly increased intrinsic muscular edema signal. Included flexor and extensor tendons are normal in appearance. Mild fluid distention and enhancement of the intermetatarsal bursae between the third and fourth metatarsal heads is present. Mild skin thickening and enhancement about the great toe is noted. IMPRESSION: 1. Findings of very mild osteitis and periostitis involving the distalmost aspect of the right foot great toe, without confluent T1 marrow signal hypointensity or significant intramedullary enhancement to suggest an associated osteomyelitis. Mild skin thickening and enhancement noted in keeping with a cellulitis. 2. Mild soft tissue ulceration adjacent to the great toe nail bed, without organized or drainable fluid collection. 3. Mild bone marrow edema and subtle subchondral flattening involving the third metatarsal head. Findings may pertain to a minor marrow contusion versus sequela of Kienb?ck's disease. 4. Mild intermetatarsal bursitis noted between the third and fourth metatarsal heads. 5. Intrinsic muscle bulk and signal abnormality compatible with subacute denervation. No results found for this or any previous visit.         CC: Shaggy Ferguson MD

## 2018-01-03 NOTE — ROUTINE PROCESS
Bedside and Verbal shift change report given to Noa Sanders RN (oncoming nurse) by Kerry Alcazar RN (offgoing nurse). Report included the following information SBAR, Kardex, MAR and Recent Results.

## 2018-01-03 NOTE — PROGRESS NOTES
2355-In bed, resting quietly. Stable. No complaints. 0132-Shift assessment complete. Denies pain. Right great toe and second toe has small dark area noted. Positive dorsalis pedis pulse, movement, sensation, and warm to right lower extremity. No complaints at this time. 0404-Denies pain. 0624-Resting quietly in bed. Denies pain. No change from initial assessment. Stable. Shift Summary:  Uneventful shift. Podiatrist at bedside during shift change. Stable. Rested quietly throughout shift.

## 2018-01-03 NOTE — DISCHARGE INSTRUCTIONS
Diabetes Foot Health: Care Instructions  Your Care Instructions    When you have diabetes, your feet need extra care and attention. Diabetes can damage the nerve endings and blood vessels in your feet, making you less likely to notice when your feet are injured. Diabetes also limits your body's ability to fight infection and get blood to areas that need it. If you get a minor foot injury, it could become an ulcer or a serious infection. With good foot care, you can prevent most of these problems. Caring for your feet can be quick and easy. Most of the care can be done when you are bathing or getting ready for bed. Follow-up care is a key part of your treatment and safety. Be sure to make and go to all appointments, and call your doctor if you are having problems. It's also a good idea to know your test results and keep a list of the medicines you take. How can you care for yourself at home? · Keep your blood sugar close to normal by watching what and how much you eat, monitoring blood sugar, taking medicines if prescribed, and getting regular exercise. · Do not smoke. Smoking affects blood flow and can make foot problems worse. If you need help quitting, talk to your doctor about stop-smoking programs and medicines. These can increase your chances of quitting for good. · Eat a diet that is low in fats. High fat intake can cause fat to build up in your blood vessels and decrease blood flow. · Inspect your feet daily for blisters, cuts, cracks, or sores. If you cannot see well, use a mirror or have someone help you. · Take care of your feet:  [facility] your feet every day. Use warm (not hot) water. Check the water temperature with your wrists or other part of your body, not your feet. ¨ Dry your feet well. Pat them dry. Do not rub the skin on your feet too hard. Dry well between your toes. If the skin on your feet stays moist, bacteria or a fungus can grow, which can lead to infection.   ¨ Keep your skin soft. Use moisturizing skin cream to keep the skin on your feet soft and prevent calluses and cracks. But do not put the cream between your toes, and stop using any cream that causes a rash. ¨ Clean underneath your toenails carefully. Do not use a sharp object to clean underneath your toenails. Use the blunt end of a nail file or other rounded tool. ¨ Trim and file your toenails straight across to prevent ingrown toenails. Use a nail clipper, not scissors. Use an emery board to smooth the edges. · Change socks daily. Socks without seams are best, because seams often rub the feet. You can find socks for people with diabetes from specialty catalogs. · Look inside your shoes every day for things like gravel or torn linings, which could cause blisters or sores. · Buy shoes that fit well:  ¨ Look for shoes that have plenty of space around the toes. This helps prevent bunions and blisters. ¨ Try on shoes while wearing the kind of socks you will usually wear with the shoes. ¨ Avoid plastic shoes. They may rub your feet and cause blisters. Good shoes should be made of materials that are flexible and breathable, such as leather or cloth. ¨ Break in new shoes slowly by wearing them for no more than an hour a day for several days. Take extra time to check your feet for red areas, blisters, or other problems after you wear new shoes. · Do not go barefoot. Do not wear sandals, and do not wear shoes with very thin soles. Thin soles are easy to puncture. They also do not protect your feet from hot pavement or cold weather. · Have your doctor check your feet during each visit. If you have a foot problem, see your doctor. Do not try to treat an early foot problem at home. Home remedies or treatments that you can buy without a prescription (such as corn removers) can be harmful. · Always get early treatment for foot problems. A minor irritation can lead to a major problem if not properly cared for early.   When should you call for help? Call your doctor now or seek immediate medical care if:  ? · You have a foot sore, an ulcer or break in the skin that is not healing after 4 days, bleeding corns or calluses, or an ingrown toenail. ? · You have blue or black areas, which can mean bruising or blood flow problems. ? · You have peeling skin or tiny blisters between your toes or cracking or oozing of the skin. ? · You have a fever for more than 24 hours and a foot sore. ? · You have new numbness or tingling in your feet that does not go away after you move your feet or change positions. ? · You have unexplained or unusual swelling of the foot or ankle. ? Watch closely for changes in your health, and be sure to contact your doctor if:  ? · You cannot do proper foot care. Where can you learn more? Go to http://jimbo-robin.info/. Enter A739 in the search box to learn more about \"Diabetes Foot Health: Care Instructions. \"  Current as of: March 13, 2017  Content Version: 11.4  © 0290-7369 [employer]. Care instructions adapted under license by [employer] (which disclaims liability or warranty for this information). If you have questions about a medical condition or this instruction, always ask your healthcare professional. [address] any warranty or liability for your use of this information. Cellulitis: Care Instructions  Your Care Instructions    Cellulitis is a skin infection. It often occurs after a break in the skin from a scrape, cut, bite, or puncture, or after a rash. The doctor has checked you carefully, but problems can develop later. If you notice any problems or new symptoms, get medical treatment right away. Follow-up care is a key part of your treatment and safety. Be sure to make and go to all appointments, and call your doctor if you are having problems.  It's also a good idea to know your test results and keep a list of the medicines you take.  How can you care for yourself at home? · Take your antibiotics as directed. Do not stop taking them just because you feel better. You need to take the full course of antibiotics. · Prop up the infected area on pillows to reduce pain and swelling. Try to keep the area above the level of your heart as often as you can. · If your doctor told you how to care for your wound, follow your doctor's instructions. If you did not get instructions, follow this general advice:  ¨ Wash the wound with clean water 2 times a day. Don't use hydrogen peroxide or alcohol, which can slow healing. ¨ You may cover the wound with a thin layer of petroleum jelly, such as Vaseline, and a nonstick bandage. ¨ Apply more petroleum jelly and replace the bandage as needed. · Be safe with medicines. Take pain medicines exactly as directed. ¨ If the doctor gave you a prescription medicine for pain, take it as prescribed. ¨ If you are not taking a prescription pain medicine, ask your doctor if you can take an over-the-counter medicine. To prevent cellulitis in the future  · Try to prevent cuts, scrapes, or other injuries to your skin. Cellulitis most often occurs where there is a break in the skin. · If you get a scrape, cut, mild burn, or bite, wash the wound with clean water as soon as you can to help avoid infection. Don't use hydrogen peroxide or alcohol, which can slow healing. · If you have swelling in your legs (edema), support stockings and good skin care may help prevent leg sores and cellulitis. · Take care of your feet, especially if you have diabetes or other conditions that increase the risk of infection. Wear shoes and socks. Do not go barefoot. If you have athlete's foot or other skin problems on your feet, talk to your doctor about how to treat them. When should you call for help?   Call your doctor now or seek immediate medical care if:  ? · You have signs that your infection is getting worse, such as:  ¨ Increased pain, swelling, warmth, or redness. ¨ Red streaks leading from the area. ¨ Pus draining from the area. ¨ A fever. ? · You get a rash. ? Watch closely for changes in your health, and be sure to contact your doctor if:  ? · You are not getting better after 1 day (24 hours). ? · You do not get better as expected. Where can you learn more? Go to http://jimbo-robin.info/. Baudilio Beyer in the search box to learn more about \"Cellulitis: Care Instructions. \"  Current as of: October 13, 2016  Content Version: 11.4  © 4577-1408 [employer]. Care instructions adapted under license by [employer] (which disclaims liability or warranty for this information). If you have questions about a medical condition or this instruction, always ask your healthcare professional. [place] [address] any warranty or liability for your use of this information. Patient armband removed and shredded    DISCHARGE SUMMARY from Nurse    PATIENT INSTRUCTIONS:    After general anesthesia or intravenous sedation, for 24 hours or while taking prescription Narcotics:  · Limit your activities  · Do not drive and operate hazardous machinery  · Do not make important personal or business decisions  · Do  not drink alcoholic beverages  · If you have not urinated within 8 hours after discharge, please contact your surgeon on call. Report the following to your surgeon:  · Excessive pain, swelling, redness or odor of or around the surgical area  · Temperature over 100.5  · Nausea and vomiting lasting longer than 4 hours or if unable to take medications  · Any signs of decreased circulation or nerve impairment to extremity: change in color, persistent  numbness, tingling, coldness or increase pain  · Any questions    What to do at Home:  Recommended activity: Activity as tolerated      *  Please give a list of your current medications to your Primary Care Provider.     * Please update this list whenever your medications are discontinued, doses are      changed, or new medications (including over-the-counter products) are added. *  Please carry medication information at all times in case of emergency situations. These are general instructions for a healthy lifestyle:    No smoking/ No tobacco products/ Avoid exposure to second hand smoke  Surgeon General's Warning:  Quitting smoking now greatly reduces serious risk to your health. Obesity, smoking, and sedentary lifestyle greatly increases your risk for illness    A healthy diet, regular physical exercise & weight monitoring are important for maintaining a healthy lifestyle    You may be retaining fluid if you have a history of heart failure or if you experience any of the following symptoms:  Weight gain of 3 pounds or more overnight or 5 pounds in a week, increased swelling in our hands or feet or shortness of breath while lying flat in bed. Please call your doctor as soon as you notice any of these symptoms; do not wait until your next office visit. Recognize signs and symptoms of STROKE:    F-face looks uneven    A-arms unable to move or move unevenly    S-speech slurred or non-existent    T-time-call 911 as soon as signs and symptoms begin-DO NOT go       Back to bed or wait to see if you get better-TIME IS BRAIN. Warning Signs of HEART ATTACK     Call 911 if you have these symptoms:   Chest discomfort. Most heart attacks involve discomfort in the center of the chest that lasts more than a few minutes, or that goes away and comes back. It can feel like uncomfortable pressure, squeezing, fullness, or pain.  Discomfort in other areas of the upper body. Symptoms can include pain or discomfort in one or both arms, the back, neck, jaw, or stomach.  Shortness of breath with or without chest discomfort.  Other signs may include breaking out in a cold sweat, nausea, or lightheadedness.   Don't wait more than five minutes to call 911 - MINUTES MATTER! Fast action can save your life. Calling 911 is almost always the fastest way to get lifesaving treatment. Emergency Medical Services staff can begin treatment when they arrive -- up to an hour sooner than if someone gets to the hospital by car. The discharge information has been reviewed with the patient. The patient verbalized understanding. Discharge medications reviewed with the patient and appropriate educational materials and side effects teaching were provided.   ___________________________________________________________________________________________________________________________________

## 2018-01-03 NOTE — PROGRESS NOTES
Pharmacy Dosing Services:Vancomycin    Consult for Vancomycin Dosing by Pharmacy by Dr. Celia Parra provided for this 49y.o. year old male , for indication of skin &soft tissue/bone & joint infection. Day of Therapy 1    Ht Readings from Last 1 Encounters:   01/02/18 182.9 cm (72\")        Wt Readings from Last 1 Encounters:   01/02/18 99.8 kg (220 lb 0.3 oz)        Other Current Antibiotics Ceftriaxone 2gm IV q q 24h   Significant Cultures pending   Serum Creatinine Lab Results   Component Value Date/Time    Creatinine 1.15 01/02/2018 03:00 PM      Creatinine Clearance Estimated Creatinine Clearance: 96.1 mL/min (based on Cr of 1.15). BUN Lab Results   Component Value Date/Time    BUN 15 01/02/2018 03:00 PM      WBC Lab Results   Component Value Date/Time    WBC 6.6 01/02/2018 03:00 PM      H/H Lab Results   Component Value Date/Time    HGB 15.8 01/02/2018 03:00 PM      Platelets Lab Results   Component Value Date/Time    PLATELET 375 10/36/6960 03:00 PM      Temp 97.9 °F (36.6 °C)     Start Vancomycin therapy, with loading dose of 2000 (mg) at 1547/ 01/02/2018(time/date). Follow with maintenance dose of 1750(mg) at 0400/01/02/2018 (time/date), every 12 hours (frequency). Dose calculated to approximate a therapeutic trough of 15-20mcg/mL. Pharmacy to follow daily and will make changes to dose and/or frequency based on clinical status. Estimated Pharmacokinetic Parameters (based on population kinetics)  Vd: 70 L (0.7 L/kg)   Berry: 0.075 hr-1 (T1/2 = 9.2 hrs)     Dosing Recommendations   Vancomycin dose: 1750 mg IV Q12hrs (infused over 2 hrs)   Estimated peak: 39.2 mcg/mL   Estimated trough: 18.5 mcg/mL   Estimated AUC:PINO: 667 mcg*hr/mL (assumed PINO 1 mcg/mL)     A/P:   1. Recommend vancomycin 1750 mg IV Q12hrs (18 mg/kg)   2. Consider a vancomycin trough level prior to the 4th dose. 3. Please monitor renal function (urine output, BUN/SCr).  Dose adjustments may be necessary with a significant change in renal function.    Pharmacist Ramila Rodgers, PHARMD

## 2018-01-03 NOTE — ROUTINE PROCESS
Bedside and Verbal shift change report given to Shorty Griffin RN and Kaela Valle RN (oncoming nurse) by Cristine Gill RN (offgoing nurse). Report included the following information SBAR and Kardex.

## 2018-01-03 NOTE — CONSULTS
[id number] [facility] Staff  MR#: [id number]  : 1970  ACCOUNT #: [de-identified]   DATE OF SERVICE: 2018    This is a very pleasant 79-year-old male who was admitted yesterday for cellulitis of the right great toe. He states that he has had a longstanding fungal infection associated with the great toe and subsequently developed a wound on the distal aspect of the toe and cellulitis. He presented himself to the emergency room, after which point he was admitted for potential underlying osteomyelitis. PAST MEDICAL HISTORY:  Significant for type 2 diabetes, hyperlipidemia, hypertension. PAST SURGICAL HISTORY:  Unremarkable. SOCIAL HISTORY:  Denies any alcohol or tobacco use. He is single and lives alone. CURRENT MEDICATIONS:  Lipitor 20 mg daily, Rocephin 2 g IV, Lovenox 40 mg q.24,   injection 4 times daily, lisinopril 5 mg daily, Lamisil 250 mg daily, vancomycin 1750 intravenous q.12. ALLERGIES:  HE DENIES. REVIEW OF SYSTEMS:  CONSTITUTIONAL:  No recent weight loss or weight gain. CARDIOVASCULAR:  No edema or temperature changes in the feet. NEUROLOGIC:  Numbness in both feet secondary to underlying peripheral neuropathy. ORTHOPEDIC:  No pain in the back, hips or knees. OBJECTIVE FINDINGS:  GENERAL:  No apparent distress, alert and oriented x3. DERMATOLOGICAL:  Patient with a small dried wound to the distal lateral aspect of the right great toe where it appears that the toenail has begun to cut into the nail border. There is no significant erythema, minimal edema, no significant tenderness. The toenail is quite dystrophic, thickened and mycotic in its appearance, and lifting from the underlying nail bed. VASCULAR:  The patient has palpable pedal pulses with immediate capillary refill, bilaterally. NEUROLOGIC:  Diminished sensation to light touch and 2-point discrimination, bilaterally.   ORTHOPEDIC:  No significant joint pain, joint crepitus or joint limitation of motion noted. There is slight valgus deviation at the hallux interphalangeal joint of the right great toe, but it is nontender and there is mild crepitus. X-RAYS:  There does seem to be some periostitis around the medial aspect of the right great toe, but this is not near the location of the ulceration. There is also slight derangement of the interphalangeal joint with joint space narrowing. MRI:  Pending. ASSESSMENT:  1. Cellulitis, right great toe. 2.  Possible underlying osteomyelitis. 3.  Onychomycosis. PLAN:  Diagnosis and treatment discussed at length with the patient. I am not convinced that there is an underlying bony infection, but the MRI will confirm this one way or the other. The wound is fairly superficial and I think is probably secondary to an intrusive mycotic toenail. We will check on the MRI later today and if negative, try to get him home before tomorrow. However, if there is evidence of osteomyelitis, we have also discussed the possibility for long-term intravenous antibiotic therapy or debridement. The patient does not want to pursue any type of surgical intervention at this point and would rather pursue conservative measures with intravenous antibiotics. Either way, he is probably going to require toenail removal at some point. I would like to thank Dr. Belle Spicer for the opportunity to see this patient and participate in his care. If there are any further questions or concerns, please do not hesitate to contact me.       SANDY RAVI  D: 01/03/2018 08:01     T: 01/03/2018 08:28  JOB #: [id number]

## 2018-01-03 NOTE — PROGRESS NOTES
Progress Note      Patient: Andie Ramirez               Sex: male          DOA: 1/2/2018       YOB: 1970      Age:  49 y.o.        LOS:  LOS: 1 day               Subjective:   Pt reports he feels good and has no pain in his foot and denies any f/c/n/v/d. Objective:      Visit Vitals    /63 (BP 1 Location: Left arm, BP Patient Position: At rest)    Pulse 74    Temp 97.9 °F (36.6 °C)    Resp 16    Ht 6' (1.829 m)    Wt 99.8 kg (220 lb 0.3 oz)    SpO2 96%    BMI 31.57 kg/m2       Physical Exam:  Resolving erythema in toe. Dystrophic nail with edema. No open wound at this time. Normal NV status    Intake and Output:  Current Shift:     Last three shifts:  01/01 1901 - 01/03 0700  In: 990 [P.O.:420; I.V.:570]  Out: 200 [Urine:200]    Lab/Data Reviewed: All lab results for the last 24 hours reviewed. Medications Reviewed          Assessment/Plan   MRI reviewed and discussed with Dr. Stefanie Jenkins and pt. Negative for OM and clinically not suspicious at this time for infection in bone  Recommend discharge on oral abx today and FU in our office for possible matrixectomy of the dystrophic nail sometime next week.        Principal Problem:    Osteomyelitis of toe of right foot ([address] (1/2/2018)    Active Problems:    Onychomycosis of multiple toenails with type 2 diabetes mellitus ([address] (1/2/2018)      Diabetes ([address] ()      Hypertension ()          [facility]  January 3, 2018

## 2018-01-03 NOTE — PROGRESS NOTES
3201  Assumed care of pt at this time. Assessment complete. Pt alert and oriented x 4. Denies SOB and chest pain. Pt lungs clear bilaterally. Cap refill  less than 3 seconds. Pt denies numbness and tingling to all extremities. Stated pain 0/10. Pt has 20 G IV to right AC. IV INT used for antibiotics. Beau Cools in color to [facility] toe, first toe and under nail to right foot. MRI today of right foot to assess for infection in bone. Podiatry and hospitalist on board. Call light and possessions within reach. Bed in low position. Will continue to monitor. 2684  Nurse made aware that MRI will be scheduled for 0930 am this morning. 56  Pt being transported to MRI at this time via wheelchair. 1022  Pt returned to room. Denies any needs at this time. Will cont to monitor.

## 2018-01-03 NOTE — H&P
History & Physical    Patient: Sarah Joy MRN: [id number]  CSN: [id number]    YOB: 1970  Age: 49 y.o. Sex: male      DOA: 1/2/2018  Primary Care Provider:  Luan Kern MD      Assessment/Plan     Patient Active Problem List   Diagnosis Code    Osteomyelitis of toe of right foot ([facility] M86.9    Onychomycosis of multiple toenails with type 2 diabetes mellitus ([facility] E11.69, B35.1    Diabetes ([facility] E11.9    Hypertension I10       Admit to medical floor    Right great to osteomyelitis - start on vancomycin, ceftriaxone, check blood cultures. Follow MRI. Consult podiatry    Onychomycosis - multiple toes on right foot. On terbinafine for 12 weeks. Has completed 9 weeks. Will continue during hospitalization. DM - hold oral hypoglycemics, start on SSI. HTN - continue home medications. DVT prophylaxis    Estimated length of stay : 2-2    CC: right great toe pain and cellulitis       HPI:     Sarah Joy is a 49 y.o. male who has past history of DM ,HTN, onychomycosis presents to ER with concerns of right great toe infection. Patient reports that he was diagnosed with onychomycosis of right toes mostly great toe. He was seen by his PCP about 2 months ago and was prescribed terbinafine for 12 weeks. He has completed 9 weeks. He has been working a lot recently and noticed his right great toe  from skin. He showed picture of it to his friends father who is retired podiatrist and he recommended to go to ER as he was concerned that he has bacterial infection. He was seen in ER yesterday and discharged on cleocin and keflex. X-ray read by radiology today showed osteomyelitis of right great toe and he was called to come and get admitted. He denies any fever/chills. Past Medical History:   Diagnosis Date    Diabetes ([facility]     High cholesterol     Hypertension        History reviewed. No pertinent surgical history. History reviewed.  No pertinent family history. Social History     Social History    Marital status: SINGLE     Spouse name: N/A    Number of children: N/A    Years of education: N/A     Social History Main Topics    Smoking status: None    Smokeless tobacco: None    Alcohol use None    Drug use: None    Sexual activity: Not Asked     Other Topics Concern    None     Social History Narrative       Prior to Admission medications    Medication Sig Start Date End Date Taking? Authorizing Provider   metFORMIN (GLUCOPHAGE) 1,000 mg tablet Take 1,000 mg by mouth two (2) times daily (with meals). Catina Nuñez MD   glimepiride (AMARYL) 4 mg tablet Take 4 mg by mouth every morning. Catina Nuñez MD   lisinopril (PRINIVIL, ZESTRIL) 5 mg tablet Take 5 mg by mouth daily. Catina Nuñez MD   atorvastatin (LIPITOR) 20 mg tablet Take 20 mg by mouth daily. Catina Nuñez MD   terbinafine HCl (LAMISIL) 250 mg tablet Take 250 mg by mouth daily. Catina Nuñez MD   cephALEXin (KEFLEX) 500 mg capsule Take 1 Cap by mouth three (3) times daily for 7 days. 1/1/18 1/8/18  Akilah Dyson MD   mupirocin (BACTROBAN) 2 % ointment Apply  to affected area three (3) times daily. Apply to area for 10 days 1/1/18   Akilah Dyson MD   clindamycin (CLEOCIN) 300 mg capsule Take 1 Cap by mouth four (4) times daily for 10 days. 1/1/18 1/11/18  Akilah Dyson MD       No Known Allergies    Review of Systems  Gen: No fever, chills, malaise, weight loss/gain. Heent: No headache, rhinorrhea, epistaxis, ear pain, hearing loss, sinus pain, neck pain/stiffness, sore throat. Heart: No chest pain, palpitations, CESPEDES, pnd, or orthopnea. Resp: No cough, hemoptysis, wheezing and shortness of breath. GI: No nausea, vomiting, diarrhea, constipation, melena or hematochezia. : No urinary obstruction, dysuria or hematuria. Derm:see above   Musc/skeletal: see above  Vasc: No edema, cyanosis or claudication. Endo: No heat/cold intolerance, no polyuria,polydipsia or polyphagia. Neuro: No unilateral weakness, numbness, tingling. No seizures. Heme: No easy bruising or bleeding. Physical Exam:     Physical Exam:  Visit Vitals    /72 (BP 1 Location: Left arm, BP Patient Position: At rest)    Pulse 74    Temp 97.9 °F (36.6 °C)    Resp 16    Ht 6' (1.829 m)    Wt 99.8 kg (220 lb 0.3 oz)    SpO2 100%    BMI 29.84 kg/m2      O2 Device: Room air    Temp (24hrs), Av.1 °F (36.7 °C), Min:97.9 °F (36.6 °C), Max:98.2 °F (36.8 °C)             General:  Awake, cooperative, no distress. Head:  Normocephalic, without obvious abnormality, atraumatic. Eyes:  Conjunctivae/corneas clear, sclera anicteric, PERRL, EOMs intact. Nose: Nares normal. No drainage or sinus tenderness. Throat: Lips, mucosa, and tongue normal.    Neck: Supple, symmetrical, trachea midline, no adenopathy. Lungs:   Clear to auscultation bilaterally. Heart:  Regular rate and rhythm, S1, S2 normal, no murmur, click, rub or gallop. Abdomen: Soft, non-tender. Bowel sounds normal. No masses,  No organomegaly. Extremities: Extremities normal, atraumatic, no cyanosis or edema. Capillary refill normal. Toes as mentioned below. Pulses: 2+ and symmetric all extremities. Skin: Right great toe with detached toe nail, onychomycosis 1-4 toe. Neurologic: CNII-XII intact. No focal motor or sensory deficit.        Labs Reviewed:    CMP:   Lab Results   Component Value Date/Time     2018 03:00 PM    K 4.2 2018 03:00 PM     2018 03:00 PM    CO2 29 2018 03:00 PM    AGAP 8 2018 03:00 PM     (H) 2018 03:00 PM    BUN 15 2018 03:00 PM    CREA 1.15 2018 03:00 PM    GFRAA >60 2018 03:00 PM    GFRNA >60 2018 03:00 PM    CA 9.5 2018 03:00 PM     CBC:   Lab Results   Component Value Date/Time    WBC 6.6 2018 03:00 PM    HGB 15.8 2018 03:00 PM    HCT 46.2 2018 03:00 PM     2018 03:00 PM Procedures/imaging: see electronic medical records for all procedures/Xrays and details which were not copied into this note but were reviewed prior to creation of Plan        CC: Benjamin Mckinnon MD

## 2018-01-03 NOTE — ROUTINE PROCESS
Dual AVS reviewed with Ramakrishna Kapadia RN. All medications reviewed individually with patient. Opportunities for questions and concerns provided. Patient discharged via (mode of transport ie. Car, ambulance or air transport) ambulated. Patient's arm band appropriately discarded.

## 2018-01-03 NOTE — PROGRESS NOTES
Problem: Falls - Risk of  Goal: *Absence of Falls  Document Lior Fall Risk and appropriate interventions in the flowsheet.    Outcome: Progressing Towards Goal  Fall Risk Interventions:            Medication Interventions: Evaluate medications/consider consulting pharmacy

## 2018-01-03 NOTE — ROUTINE PROCESS
Bedside and Verbal shift change report given to Dylan Oh RN (oncoming nurse) by Bianca Martinez RN (offgoing nurse). Report included the following information SBAR and Kardex.

## 2018-01-08 LAB
BACTERIA SPEC CULT: NORMAL
SERVICE CMNT-IMP: NORMAL

## 2024-09-04 NOTE — PROGRESS NOTES
09/04/24 0848   Team Meeting   Meeting Type Daily Rounds   Team Members Present   Team Members Present Physician;Nurse;   Physician Team Member Alex   Nursing Team Member [facility] Team Member Noa   Patient/Family Present   Patient Present No   Patient's Family Present No     Pt denies SI/HI/AVH. Pt is medication and meal compliant. Pt is waiting placement.   Chart reviewed. Pt admitted for R great toe osteomyelitis. Pt has PMH of DM, HTN, HLD. No immediate needs identified at this time. CM will cont to follow. 1200  Pt with patient at bedside. Pt states a family member will drive him home at discharge. No needs identified at this time. CM will cont to follow. Discharge Reassessment Plan:  Low Risk    RRAT Score:  1 - 12     Low Risk Care Transition Interventions:  1. Discharge transition plan:  Discharge home with physician follow up  2. Involved patient/caregiver in assessment, planning, education and implement of intervention. 3. CM daily patient care huddles/interdisciplinary rounds were completed. 4. PCP/Specialist appointment within 5 days made prior to discharge. Date/Time  5. Facilitated transportation and logistics for follow-up appointments. 6. Handoff to [address] [facility] Nurse Navigator or PCP practice. Care Management Interventions  PCP Verified by CM: Yes  Mode of Transport at Discharge:  Other (see comment) (family)  Transition of Care Consult (CM Consult): Discharge Planning  Health Maintenance Reviewed: Yes  Current Support Network: Own Home  Confirm Follow Up Transport: Self  Plan discussed with Pt/Family/Caregiver: Yes  Freedom of Choice Offered: Yes  Discharge Location  Discharge Placement: Home with family assistance